# Patient Record
Sex: FEMALE | Race: WHITE | NOT HISPANIC OR LATINO | URBAN - METROPOLITAN AREA
[De-identification: names, ages, dates, MRNs, and addresses within clinical notes are randomized per-mention and may not be internally consistent; named-entity substitution may affect disease eponyms.]

---

## 2024-03-22 ENCOUNTER — OFFICE VISIT (OUTPATIENT)
Dept: SPORTS MEDICINE | Facility: CLINIC | Age: 24
End: 2024-03-22
Payer: COMMERCIAL

## 2024-03-22 DIAGNOSIS — S13.4XXA WHIPLASH INJURY TO NECK, INITIAL ENCOUNTER: ICD-10-CM

## 2024-03-22 DIAGNOSIS — S06.0X0A CONCUSSION WITHOUT LOSS OF CONSCIOUSNESS, INITIAL ENCOUNTER: Primary | ICD-10-CM

## 2024-03-22 PROCEDURE — 99204 OFFICE O/P NEW MOD 45 MIN: CPT | Mod: S$GLB,,, | Performed by: STUDENT IN AN ORGANIZED HEALTH CARE EDUCATION/TRAINING PROGRAM

## 2024-03-22 PROCEDURE — 1159F MED LIST DOCD IN RCRD: CPT | Mod: CPTII,S$GLB,, | Performed by: STUDENT IN AN ORGANIZED HEALTH CARE EDUCATION/TRAINING PROGRAM

## 2024-03-22 PROCEDURE — 1160F RVW MEDS BY RX/DR IN RCRD: CPT | Mod: CPTII,S$GLB,, | Performed by: STUDENT IN AN ORGANIZED HEALTH CARE EDUCATION/TRAINING PROGRAM

## 2024-03-22 PROCEDURE — 99999 PR PBB SHADOW E&M-NEW PATIENT-LVL III: CPT | Mod: PBBFAC,,, | Performed by: STUDENT IN AN ORGANIZED HEALTH CARE EDUCATION/TRAINING PROGRAM

## 2024-03-22 RX ORDER — TIZANIDINE 4 MG/1
2-4 TABLET ORAL EVERY 8 HOURS PRN
COMMUNITY
Start: 2024-03-19

## 2024-03-22 RX ORDER — MELOXICAM 15 MG/1
15 TABLET ORAL
COMMUNITY
Start: 2024-03-19

## 2024-03-22 RX ORDER — NORGESTIMATE AND ETHINYL ESTRADIOL 7DAYSX3 LO
1 KIT ORAL
COMMUNITY
Start: 2023-12-29

## 2024-03-22 NOTE — LETTER
Patient: Kenzie Kern   YOB: 2000   Clinic Number: [id number]   Today's Date: March 22, 2024        Certificate to Return to Work     Kenzie Montana was seen by Virgil Manzano MD on 3/22/2024.    Kenzie Montana can return to work on 3/25/2024.    If you have any questions or concerns, please feel free to contact the office at [phone].    Thank you.    Virgil Manzano MD
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
## 2024-03-22 NOTE — PATIENT INSTRUCTIONS
Assessment:  Kenzie Kern is a 23 y.o. female with a chief complaint of Concussion    Encounter Diagnoses   Name Primary?    Concussion without loss of consciousness, initial encounter Yes    Whiplash injury to neck, initial encounter       Plan:  History and clinical exam, mechanism injury consistent with a mild concussion at this time.  Still quite symptomatic based on evaluation symptom scoring today.  Diagnosis, treatment options, prognosis discussed today.    Recommend relative physical and cognitive rest at this time, as you are still acute from the injury, and still relatively symptomatic.    Work note provided today to be off work until Monday, 3/25.  This weekend we will probably feel kind of boring, I do not want to do much activity wise, and I do not want to do much in terms of screen time, such as computers, phones, TB, as this can be very stimulating to the brain, and as such may worsen your symptoms and prolonged your recovery.  In general, let symptoms be her guide.  If something is causing symptoms, then this is your body telling you he needs to back off that a little bit slow down.2  This weekend, if you are feeling up to it, some light exercise such as walking for 15-20 minutes is acceptable, as long as this does not cause any worsening of your symptoms.    Given your whiplash type injury, cervicogenic pain, recommend to continue the meloxicam and tizanidine that you were previously prescribed.  If you need something on top of this, please use Tylenol, did not use ibuprofen or naproxen.  Can use ice and/or heat tear neck, as needed.  We will follow up in 1 week, repeat symptom evaluation, and should the gradual improvement of your symptoms  Driving is okay at this time, as you are not having a major neurologic, vestibular, or reactive abnormalities.    Follow-up: 1 week or sooner if there are any problems between now and then.    Thank you for choosing [facility]  and Dr. Virgil Manzano for your orthopedic & sports medicine care. It is our goal to provide you with exceptional care that will help keep you healthy, active, and get you back in the game.    Please do not hesitate to reach out to us via email, phone, or MyChart with any questions, concerns, or feedback.    If you are experiencing pain/discomfort ,or have questions after 5pm and would like to be connected to the [facility] [facility] on-call team, please call this number and specify which Sports Medicine provider is treating you: [phone]

## 2024-03-28 ENCOUNTER — OFFICE VISIT (OUTPATIENT)
Dept: SPORTS MEDICINE | Facility: CLINIC | Age: 24
End: 2024-03-28
Payer: COMMERCIAL

## 2024-03-28 DIAGNOSIS — S06.0X0D CONCUSSION WITHOUT LOSS OF CONSCIOUSNESS, SUBSEQUENT ENCOUNTER: ICD-10-CM

## 2024-03-28 DIAGNOSIS — S13.4XXD WHIPLASH INJURY TO NECK, SUBSEQUENT ENCOUNTER: Primary | ICD-10-CM

## 2024-03-28 PROCEDURE — 99999 PR PBB SHADOW E&M-EST. PATIENT-LVL II: CPT | Mod: PBBFAC,,, | Performed by: STUDENT IN AN ORGANIZED HEALTH CARE EDUCATION/TRAINING PROGRAM

## 2024-03-28 PROCEDURE — 99214 OFFICE O/P EST MOD 30 MIN: CPT | Mod: S$GLB,,, | Performed by: STUDENT IN AN ORGANIZED HEALTH CARE EDUCATION/TRAINING PROGRAM

## 2024-03-28 PROCEDURE — 1159F MED LIST DOCD IN RCRD: CPT | Mod: CPTII,S$GLB,, | Performed by: STUDENT IN AN ORGANIZED HEALTH CARE EDUCATION/TRAINING PROGRAM

## 2024-03-28 PROCEDURE — 1160F RVW MEDS BY RX/DR IN RCRD: CPT | Mod: CPTII,S$GLB,, | Performed by: STUDENT IN AN ORGANIZED HEALTH CARE EDUCATION/TRAINING PROGRAM
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
## 2024-03-28 NOTE — PATIENT INSTRUCTIONS
Assessment:  Kenzie Kern is a 23 y.o. female with a chief complaint of Concussion    Encounter Diagnoses   Name Primary?    Whiplash injury to neck, subsequent encounter Yes    Concussion without loss of consciousness, subsequent encounter       Plan:  Patient is showing improvement in her post concussive symptoms at this time.    Would anticipate continued gradual recovery.    Recommend to try to incorporate some light cardiovascular activity, recommend walking daily for up to 15-20 minutes, as symptoms allow.  Doing this either before work, or after work, as your energy allows.    Continue taking your short breaks throughout the day, as needed.    Recommend to try box breathing maneuver for mindfulness activity, as this can help with stress and anxiety during the day.    Inhale for 5 seconds - hold your breath for 5 seconds - exhale for 5 seconds - hold your breath for another 5 seconds  Repeat this process at least 5 cycles, and for up to 5-10 minutes, as you are able  For your neck and back, your symptoms are still consistent with a whiplash type injury.  I see no indication of more significant injury, such as fracture, disc herniation, etc..    The symptoms should gradually improve as well, however can take a little bit longer.    Continue regular movement of, stretching as you are able.    Recommend gentle yoga for your neck and you are back as well, as this can help with stretching and relaxation.    The would also recommend gentle manual massage over the affected areas.    Can also use ice and/or heat to the affected areas, as tolerated.    While still symptomatic, would recommend to avoid chiropractic manipulation.  Your symptoms should gradually improve over the next couple of weeks.    If, however, you are still having significant symptoms in 2 weeks, please contact us for follow up.    Follow-up: As needed or sooner if there are any problems between now and then.    Thank you for choosing  [facility] and Dr. Virgil Manzano for your orthopedic & sports medicine care. It is our goal to provide you with exceptional care that will help keep you healthy, active, and get you back in the game.    Please do not hesitate to reach out to us via email, phone, or MyChart with any questions, concerns, or feedback.    If you are experiencing pain/discomfort ,or have questions after 5pm and would like to be connected to the [facility] on-call team, please call this number and specify which Sports Medicine provider is treating you: [phone]